# Patient Record
Sex: FEMALE | Race: OTHER | NOT HISPANIC OR LATINO
[De-identification: names, ages, dates, MRNs, and addresses within clinical notes are randomized per-mention and may not be internally consistent; named-entity substitution may affect disease eponyms.]

---

## 2019-08-21 PROBLEM — Z00.00 ENCOUNTER FOR PREVENTIVE HEALTH EXAMINATION: Status: ACTIVE | Noted: 2019-08-21

## 2019-08-28 ENCOUNTER — ASOB RESULT (OUTPATIENT)
Age: 29
End: 2019-08-28

## 2019-08-28 ENCOUNTER — APPOINTMENT (OUTPATIENT)
Dept: ANTEPARTUM | Facility: CLINIC | Age: 29
End: 2019-08-28
Payer: MEDICAID

## 2019-08-28 PROCEDURE — 99201 OFFICE OUTPATIENT NEW 10 MINUTES: CPT | Mod: 25,TH

## 2019-12-26 ENCOUNTER — EMERGENCY (EMERGENCY)
Facility: HOSPITAL | Age: 29
LOS: 1 days | Discharge: HOME | End: 2019-12-26
Admitting: EMERGENCY MEDICINE
Payer: COMMERCIAL

## 2019-12-26 VITALS
HEART RATE: 90 BPM | TEMPERATURE: 98 F | OXYGEN SATURATION: 100 % | DIASTOLIC BLOOD PRESSURE: 87 MMHG | SYSTOLIC BLOOD PRESSURE: 131 MMHG | RESPIRATION RATE: 18 BRPM

## 2019-12-26 VITALS
OXYGEN SATURATION: 99 % | RESPIRATION RATE: 17 BRPM | DIASTOLIC BLOOD PRESSURE: 95 MMHG | SYSTOLIC BLOOD PRESSURE: 160 MMHG | TEMPERATURE: 100 F | HEART RATE: 81 BPM

## 2019-12-26 LAB
ALBUMIN SERPL ELPH-MCNC: 3.2 G/DL — LOW (ref 3.3–5)
ALP SERPL-CCNC: 303 U/L — HIGH (ref 40–120)
ALT FLD-CCNC: 20 U/L — SIGNIFICANT CHANGE UP (ref 4–33)
ANION GAP SERPL CALC-SCNC: 12 MMO/L — SIGNIFICANT CHANGE UP (ref 7–14)
AST SERPL-CCNC: 29 U/L — SIGNIFICANT CHANGE UP (ref 4–32)
BASOPHILS # BLD AUTO: 0.02 K/UL — SIGNIFICANT CHANGE UP (ref 0–0.2)
BASOPHILS NFR BLD AUTO: 0.2 % — SIGNIFICANT CHANGE UP (ref 0–2)
BILIRUB SERPL-MCNC: 0.7 MG/DL — SIGNIFICANT CHANGE UP (ref 0.2–1.2)
BUN SERPL-MCNC: 6 MG/DL — LOW (ref 7–23)
CALCIUM SERPL-MCNC: 9 MG/DL — SIGNIFICANT CHANGE UP (ref 8.4–10.5)
CHLORIDE SERPL-SCNC: 104 MMOL/L — SIGNIFICANT CHANGE UP (ref 98–107)
CO2 SERPL-SCNC: 20 MMOL/L — LOW (ref 22–31)
CREAT SERPL-MCNC: 0.59 MG/DL — SIGNIFICANT CHANGE UP (ref 0.5–1.3)
EOSINOPHIL # BLD AUTO: 0.07 K/UL — SIGNIFICANT CHANGE UP (ref 0–0.5)
EOSINOPHIL NFR BLD AUTO: 0.7 % — SIGNIFICANT CHANGE UP (ref 0–6)
GLUCOSE SERPL-MCNC: 80 MG/DL — SIGNIFICANT CHANGE UP (ref 70–99)
HCT VFR BLD CALC: 34.2 % — LOW (ref 34.5–45)
HGB BLD-MCNC: 10.4 G/DL — LOW (ref 11.5–15.5)
IMM GRANULOCYTES NFR BLD AUTO: 0.4 % — SIGNIFICANT CHANGE UP (ref 0–1.5)
LYMPHOCYTES # BLD AUTO: 1.67 K/UL — SIGNIFICANT CHANGE UP (ref 1–3.3)
LYMPHOCYTES # BLD AUTO: 17.1 % — SIGNIFICANT CHANGE UP (ref 13–44)
MCHC RBC-ENTMCNC: 22 PG — LOW (ref 27–34)
MCHC RBC-ENTMCNC: 30.4 % — LOW (ref 32–36)
MCV RBC AUTO: 72.5 FL — LOW (ref 80–100)
MONOCYTES # BLD AUTO: 0.41 K/UL — SIGNIFICANT CHANGE UP (ref 0–0.9)
MONOCYTES NFR BLD AUTO: 4.2 % — SIGNIFICANT CHANGE UP (ref 2–14)
NEUTROPHILS # BLD AUTO: 7.57 K/UL — HIGH (ref 1.8–7.4)
NEUTROPHILS NFR BLD AUTO: 77.4 % — HIGH (ref 43–77)
NRBC # FLD: 0 K/UL — SIGNIFICANT CHANGE UP (ref 0–0)
PLATELET # BLD AUTO: 345 K/UL — SIGNIFICANT CHANGE UP (ref 150–400)
PMV BLD: 9.4 FL — SIGNIFICANT CHANGE UP (ref 7–13)
POTASSIUM SERPL-MCNC: 3.7 MMOL/L — SIGNIFICANT CHANGE UP (ref 3.5–5.3)
POTASSIUM SERPL-SCNC: 3.7 MMOL/L — SIGNIFICANT CHANGE UP (ref 3.5–5.3)
PROT SERPL-MCNC: 7.1 G/DL — SIGNIFICANT CHANGE UP (ref 6–8.3)
RBC # BLD: 4.72 M/UL — SIGNIFICANT CHANGE UP (ref 3.8–5.2)
RBC # FLD: 14.5 % — SIGNIFICANT CHANGE UP (ref 10.3–14.5)
SODIUM SERPL-SCNC: 136 MMOL/L — SIGNIFICANT CHANGE UP (ref 135–145)
WBC # BLD: 9.78 K/UL — SIGNIFICANT CHANGE UP (ref 3.8–10.5)
WBC # FLD AUTO: 9.78 K/UL — SIGNIFICANT CHANGE UP (ref 3.8–10.5)

## 2019-12-26 PROCEDURE — 99283 EMERGENCY DEPT VISIT LOW MDM: CPT

## 2019-12-26 PROCEDURE — 71045 X-RAY EXAM CHEST 1 VIEW: CPT | Mod: 26

## 2019-12-26 NOTE — ED PROVIDER NOTE - OBJECTIVE STATEMENT
Pt is a 30 y/o F pregnant (due date 1/15/20) p/w itching x 2 months and cough x 1 week. Pt speaks [place]; pacific  ID#[id number] used to confirm pt's preference to have , Misael, translate for her.  She states she has had constant unchanged whole body itching to whole body for past 2 months.  She saw ob/gyn who told pt to see a dermatologist, with whom pt followed up 1 month ago and was started on a cream, with which pt has been compliant BID; not sure what cream she was started on.  She was told to see dermatologist again after delivery of baby.  She also notes she has developed a gradual onset sore throat and cough, producing scant white nonbloody sputum associated with bilateral inner ear pain.  Pt also notes intermittent mild frontal headache, nonradiating which occurs only with "coughing hard."  Pt also notes nasal congestion.  Pt states she was seen at urgent care for symptoms and was told she had a viral syndrome.  She also notes constipation described as passing hard stools; last BM yesterday; passing gas today.  Pt denies any fevers, chills, nausea, vomiting, chest pain, SOB, numbness, weakness, neck pain/stiffness, pelvic pain, vaginal bleeding, vaginal discharge, cloudy urine, hematuria, leakage of fluid from vagina, decreased fetal movements.  She shares bed with  who does not have any itching or symptoms.  No other family members with same symptoms.

## 2019-12-26 NOTE — ED PROVIDER NOTE - NONTENDER LOCATION
right costovertebral angle/periumbilical/left costovertebral angle/suprapubic/left upper quadrant/right upper quadrant/umbilical/left lower quadrant/right lower quadrant

## 2019-12-26 NOTE — ED PROVIDER NOTE - LEFT EAR
Report to OTIS Gambino   TM intact; no effusion; no canal erythema/discharge; no mastoid tenderness/redness/swelling

## 2019-12-26 NOTE — ED PROVIDER NOTE - NSFOLLOWUPINSTRUCTIONS_ED_ALL_ED_FT
Advance activity as tolerated.  Continue all previously prescribed medications as directed unless otherwise instructed.  Take Tylenol 650mg (Two 325 mg pills) every 4-6 hours as needed for pain or fevers.  Follow up with your primary care physician and dermatology (referral list provided) in 48-72 hours- bring copies of your results.  Return to the ER for worsening or persistent symptoms, including but not limited to worsening/persistent itching, ear pain, hearing loss, headache, neck pain/stiffness, fevers, chest pain, shortness of breath, and/or ANY NEW OR CONCERNING SYMPTOMS. If you have issues obtaining follow up, please call: [phone] to obtain a doctor or specialist who takes your insurance in your area.  You may call [phone] to make an appointment with the internal medicine clinic.

## 2019-12-26 NOTE — ED PROVIDER NOTE - PROGRESS NOTE DETAILS
JANES OVERTON:  Preliminary read shows normal clear lungs.  Labs shows elevated alk phos without any other hepatic lab findings.  Pt medically stable for discharge.  Pt agrees to follow up with OB/GYN and dermatology.  Strict return precautions given.

## 2019-12-26 NOTE — ED PROVIDER NOTE - CLINICAL SUMMARY MEDICAL DECISION MAKING FREE TEXT BOX
Pt is a 30 y/o F pregnant (due date 1/15/20) p/w itching x 2 months and cough x 1 week. -- likely viral syndrome, r/o pneumonia, r/o eval for possible hepatic pathology; not clinically concerning for scabies; not clinically concerning for meningitis -- labs, cxr, outpatient derm follow up

## 2019-12-26 NOTE — ED PROVIDER NOTE - PHYSICAL EXAMINATION
+scabbed scattered excoriations to bilateral anterior shins and forearms    no nuchal rigidity    excoriations spares palms and soles  no intertriginous excoriation  (Chaperone:  JANES Toscano) +scabbed scattered excoriations to bilateral anterior shins and forearms    no nuchal rigidity    excoriations spares palms and soles  no intertriginous excoriation  (Chaperone:  JANES Toscano)    Fetal heart rate:  145 bpm

## 2019-12-26 NOTE — ED PROVIDER NOTE - PATIENT PORTAL LINK FT
You can access the FollowMyHealth Patient Portal offered by [facility] by registering at the following website: [email or web address]. By joining [employer]’s FollowMyHealth portal, you will also be able to view your health information using other applications (apps) compatible with our system.

## 2019-12-26 NOTE — ED ADULT TRIAGE NOTE - CHIEF COMPLAINT QUOTE
# Birgit #[id number]  "both of my ears are hurting, I have a cold and a rash and I'm pregnant" x 2 months "not getting better"  As per  "they gave her cream and it did not help"  Did not f/u with PMD.  ERIC 1/15/2019.  As per  used cream for 1 week w/o relief.  c/o "my whole body is hurting, my ears, my neck and my body is very itchy"  Pt appears to have dry patches to lower extremities.

## 2019-12-26 NOTE — ED PROVIDER NOTE - THROAT FINDINGS
no tonsillar swelling/NO VESICLES/ULCERS/NO TONGUE ELEVATION/no vesicles/NO STRIDOR/THROAT RED/NO DROOLING/no exudate

## 2019-12-27 NOTE — ED ADULT NURSE NOTE - CHIEF COMPLAINT QUOTE
# Birgit #[id number]  "both of my ears are hurting, I have a cold and a rash and I'm pregnant" x 2 months "not getting better"  As per  "they gave her cream and it did not help"  Did not f/u with PMD.  ERIC 1/15/2019.  As per  used cream for 1 week w/o relief.  c/o "my whole body is hurting, my ears, my neck and my body is very itchy"  Pt appears to have dry patches to lower extremities.

## 2020-01-06 ENCOUNTER — INPATIENT (INPATIENT)
Facility: HOSPITAL | Age: 30
LOS: 2 days | Discharge: HOME | End: 2020-01-09
Attending: OBSTETRICS & GYNECOLOGY | Admitting: OBSTETRICS & GYNECOLOGY

## 2020-01-06 ENCOUNTER — OUTPATIENT (OUTPATIENT)
Dept: INPATIENT UNIT | Facility: HOSPITAL | Age: 30
LOS: 1 days | End: 2020-01-06
Payer: MEDICAID

## 2020-01-06 VITALS
RESPIRATION RATE: 16 BRPM | DIASTOLIC BLOOD PRESSURE: 72 MMHG | SYSTOLIC BLOOD PRESSURE: 122 MMHG | TEMPERATURE: 99 F | HEART RATE: 93 BPM

## 2020-01-06 DIAGNOSIS — Z3A.00 WEEKS OF GESTATION OF PREGNANCY NOT SPECIFIED: ICD-10-CM

## 2020-01-06 DIAGNOSIS — O26.899 OTHER SPECIFIED PREGNANCY RELATED CONDITIONS, UNSPECIFIED TRIMESTER: ICD-10-CM

## 2020-01-07 LAB
ALBUMIN SERPL ELPH-MCNC: 3.1 G/DL — LOW (ref 3.3–5)
ALP SERPL-CCNC: 286 U/L — HIGH (ref 40–120)
ALT FLD-CCNC: 33 U/L — SIGNIFICANT CHANGE UP (ref 4–33)
ANION GAP SERPL CALC-SCNC: 12 MMO/L — SIGNIFICANT CHANGE UP (ref 7–14)
APPEARANCE UR: SIGNIFICANT CHANGE UP
APTT BLD: 26.7 SEC — LOW (ref 27.5–36.3)
AST SERPL-CCNC: 30 U/L — SIGNIFICANT CHANGE UP (ref 4–32)
BACTERIA # UR AUTO: SIGNIFICANT CHANGE UP
BASOPHILS # BLD AUTO: 0.02 K/UL — SIGNIFICANT CHANGE UP (ref 0–0.2)
BASOPHILS NFR BLD AUTO: 0.2 % — SIGNIFICANT CHANGE UP (ref 0–2)
BILIRUB SERPL-MCNC: 0.6 MG/DL — SIGNIFICANT CHANGE UP (ref 0.2–1.2)
BILIRUB UR-MCNC: NEGATIVE — SIGNIFICANT CHANGE UP
BLD GP AB SCN SERPL QL: NEGATIVE — SIGNIFICANT CHANGE UP
BLOOD UR QL VISUAL: NEGATIVE — SIGNIFICANT CHANGE UP
BUN SERPL-MCNC: 5 MG/DL — LOW (ref 7–23)
CALCIUM SERPL-MCNC: 8.8 MG/DL — SIGNIFICANT CHANGE UP (ref 8.4–10.5)
CHLORIDE SERPL-SCNC: 106 MMOL/L — SIGNIFICANT CHANGE UP (ref 98–107)
CO2 SERPL-SCNC: 21 MMOL/L — LOW (ref 22–31)
COLOR SPEC: YELLOW — SIGNIFICANT CHANGE UP
CREAT ?TM UR-MCNC: 96.2 MG/DL — SIGNIFICANT CHANGE UP
CREAT SERPL-MCNC: 0.57 MG/DL — SIGNIFICANT CHANGE UP (ref 0.5–1.3)
EOSINOPHIL # BLD AUTO: 0.07 K/UL — SIGNIFICANT CHANGE UP (ref 0–0.5)
EOSINOPHIL NFR BLD AUTO: 0.8 % — SIGNIFICANT CHANGE UP (ref 0–6)
FIBRINOGEN PPP-MCNC: 687 MG/DL — HIGH (ref 350–510)
GLUCOSE SERPL-MCNC: 79 MG/DL — SIGNIFICANT CHANGE UP (ref 70–99)
GLUCOSE UR-MCNC: NEGATIVE — SIGNIFICANT CHANGE UP
HCT VFR BLD CALC: 32.5 % — LOW (ref 34.5–45)
HCT VFR BLD CALC: 35.8 % — SIGNIFICANT CHANGE UP (ref 34.5–45)
HGB BLD-MCNC: 10 G/DL — LOW (ref 11.5–15.5)
HGB BLD-MCNC: 10.7 G/DL — LOW (ref 11.5–15.5)
HYALINE CASTS # UR AUTO: NEGATIVE — SIGNIFICANT CHANGE UP
IMM GRANULOCYTES NFR BLD AUTO: 0.4 % — SIGNIFICANT CHANGE UP (ref 0–1.5)
INR BLD: 1.01 — SIGNIFICANT CHANGE UP (ref 0.88–1.17)
KETONES UR-MCNC: SIGNIFICANT CHANGE UP
LDH SERPL L TO P-CCNC: 208 U/L — SIGNIFICANT CHANGE UP (ref 135–225)
LEUKOCYTE ESTERASE UR-ACNC: SIGNIFICANT CHANGE UP
LYMPHOCYTES # BLD AUTO: 1.98 K/UL — SIGNIFICANT CHANGE UP (ref 1–3.3)
LYMPHOCYTES # BLD AUTO: 21.5 % — SIGNIFICANT CHANGE UP (ref 13–44)
MCHC RBC-ENTMCNC: 21.7 PG — LOW (ref 27–34)
MCHC RBC-ENTMCNC: 21.9 PG — LOW (ref 27–34)
MCHC RBC-ENTMCNC: 29.9 % — LOW (ref 32–36)
MCHC RBC-ENTMCNC: 30.8 % — LOW (ref 32–36)
MCV RBC AUTO: 71.1 FL — LOW (ref 80–100)
MCV RBC AUTO: 72.5 FL — LOW (ref 80–100)
MONOCYTES # BLD AUTO: 0.46 K/UL — SIGNIFICANT CHANGE UP (ref 0–0.9)
MONOCYTES NFR BLD AUTO: 5 % — SIGNIFICANT CHANGE UP (ref 2–14)
NEUTROPHILS # BLD AUTO: 6.63 K/UL — SIGNIFICANT CHANGE UP (ref 1.8–7.4)
NEUTROPHILS NFR BLD AUTO: 72.1 % — SIGNIFICANT CHANGE UP (ref 43–77)
NITRITE UR-MCNC: NEGATIVE — SIGNIFICANT CHANGE UP
NRBC # FLD: 0 K/UL — SIGNIFICANT CHANGE UP (ref 0–0)
NRBC # FLD: 0 K/UL — SIGNIFICANT CHANGE UP (ref 0–0)
PH UR: 6.5 — SIGNIFICANT CHANGE UP (ref 5–8)
PLATELET # BLD AUTO: 348 K/UL — SIGNIFICANT CHANGE UP (ref 150–400)
PLATELET # BLD AUTO: 412 K/UL — HIGH (ref 150–400)
PMV BLD: 10 FL — SIGNIFICANT CHANGE UP (ref 7–13)
PMV BLD: 10.3 FL — SIGNIFICANT CHANGE UP (ref 7–13)
POTASSIUM SERPL-MCNC: 3.6 MMOL/L — SIGNIFICANT CHANGE UP (ref 3.5–5.3)
POTASSIUM SERPL-SCNC: 3.6 MMOL/L — SIGNIFICANT CHANGE UP (ref 3.5–5.3)
PROT SERPL-MCNC: 6.4 G/DL — SIGNIFICANT CHANGE UP (ref 6–8.3)
PROT UR-MCNC: 30 — SIGNIFICANT CHANGE UP
PROT UR-MCNC: 41.6 MG/DL — SIGNIFICANT CHANGE UP
PROTHROM AB SERPL-ACNC: 11.2 SEC — SIGNIFICANT CHANGE UP (ref 9.8–13.1)
RBC # BLD: 4.57 M/UL — SIGNIFICANT CHANGE UP (ref 3.8–5.2)
RBC # BLD: 4.94 M/UL — SIGNIFICANT CHANGE UP (ref 3.8–5.2)
RBC # FLD: 15.4 % — HIGH (ref 10.3–14.5)
RBC # FLD: 15.4 % — HIGH (ref 10.3–14.5)
RBC CASTS # UR COMP ASSIST: SIGNIFICANT CHANGE UP (ref 0–?)
RH IG SCN BLD-IMP: POSITIVE — SIGNIFICANT CHANGE UP
RH IG SCN BLD-IMP: POSITIVE — SIGNIFICANT CHANGE UP
SODIUM SERPL-SCNC: 139 MMOL/L — SIGNIFICANT CHANGE UP (ref 135–145)
SP GR SPEC: 1.02 — SIGNIFICANT CHANGE UP (ref 1–1.04)
SQUAMOUS # UR AUTO: SIGNIFICANT CHANGE UP
T PALLIDUM AB TITR SER: NEGATIVE — SIGNIFICANT CHANGE UP
URATE SERPL-MCNC: 4.6 MG/DL — SIGNIFICANT CHANGE UP (ref 2.5–7)
UROBILINOGEN FLD QL: NORMAL — SIGNIFICANT CHANGE UP
WBC # BLD: 9.2 K/UL — SIGNIFICANT CHANGE UP (ref 3.8–10.5)
WBC # BLD: 9.47 K/UL — SIGNIFICANT CHANGE UP (ref 3.8–10.5)
WBC # FLD AUTO: 9.2 K/UL — SIGNIFICANT CHANGE UP (ref 3.8–10.5)
WBC # FLD AUTO: 9.47 K/UL — SIGNIFICANT CHANGE UP (ref 3.8–10.5)
WBC UR QL: HIGH (ref 0–?)

## 2020-01-07 RX ORDER — INFLUENZA VIRUS VACCINE 15; 15; 15; 15 UG/.5ML; UG/.5ML; UG/.5ML; UG/.5ML
0.5 SUSPENSION INTRAMUSCULAR ONCE
Refills: 0 | Status: DISCONTINUED | OUTPATIENT
Start: 2020-01-07 | End: 2020-01-09

## 2020-01-07 RX ORDER — SODIUM CHLORIDE 9 MG/ML
1000 INJECTION, SOLUTION INTRAVENOUS
Refills: 0 | Status: DISCONTINUED | OUTPATIENT
Start: 2020-01-07 | End: 2020-01-07

## 2020-01-07 RX ORDER — OXYTOCIN 10 UNIT/ML
333.33 VIAL (ML) INJECTION
Qty: 20 | Refills: 0 | Status: DISCONTINUED | OUTPATIENT
Start: 2020-01-07 | End: 2020-01-07

## 2020-01-07 RX ORDER — GLYCERIN ADULT
1 SUPPOSITORY, RECTAL RECTAL AT BEDTIME
Refills: 0 | Status: DISCONTINUED | OUTPATIENT
Start: 2020-01-07 | End: 2020-01-09

## 2020-01-07 RX ORDER — AER TRAVELER 0.5 G/1
1 SOLUTION RECTAL; TOPICAL EVERY 4 HOURS
Refills: 0 | Status: DISCONTINUED | OUTPATIENT
Start: 2020-01-07 | End: 2020-01-09

## 2020-01-07 RX ORDER — OXYCODONE HYDROCHLORIDE 5 MG/1
5 TABLET ORAL ONCE
Refills: 0 | Status: DISCONTINUED | OUTPATIENT
Start: 2020-01-07 | End: 2020-01-09

## 2020-01-07 RX ORDER — HYDROCORTISONE 1 %
1 OINTMENT (GRAM) TOPICAL EVERY 6 HOURS
Refills: 0 | Status: DISCONTINUED | OUTPATIENT
Start: 2020-01-07 | End: 2020-01-09

## 2020-01-07 RX ORDER — IBUPROFEN 200 MG
600 TABLET ORAL EVERY 6 HOURS
Refills: 0 | Status: COMPLETED | OUTPATIENT
Start: 2020-01-07 | End: 2020-12-05

## 2020-01-07 RX ORDER — CITRIC ACID/SODIUM CITRATE 300-500 MG
15 SOLUTION, ORAL ORAL EVERY 6 HOURS
Refills: 0 | Status: DISCONTINUED | OUTPATIENT
Start: 2020-01-07 | End: 2020-01-07

## 2020-01-07 RX ORDER — DIPHENHYDRAMINE HCL 50 MG
25 CAPSULE ORAL EVERY 6 HOURS
Refills: 0 | Status: DISCONTINUED | OUTPATIENT
Start: 2020-01-07 | End: 2020-01-09

## 2020-01-07 RX ORDER — MAGNESIUM HYDROXIDE 400 MG/1
30 TABLET, CHEWABLE ORAL
Refills: 0 | Status: DISCONTINUED | OUTPATIENT
Start: 2020-01-07 | End: 2020-01-09

## 2020-01-07 RX ORDER — TETANUS TOXOID, REDUCED DIPHTHERIA TOXOID AND ACELLULAR PERTUSSIS VACCINE, ADSORBED 5; 2.5; 8; 8; 2.5 [IU]/.5ML; [IU]/.5ML; UG/.5ML; UG/.5ML; UG/.5ML
0.5 SUSPENSION INTRAMUSCULAR ONCE
Refills: 0 | Status: DISCONTINUED | OUTPATIENT
Start: 2020-01-07 | End: 2020-01-09

## 2020-01-07 RX ORDER — DIPHENHYDRAMINE HCL 50 MG
25 CAPSULE ORAL EVERY 4 HOURS
Refills: 0 | Status: DISCONTINUED | OUTPATIENT
Start: 2020-01-07 | End: 2020-01-09

## 2020-01-07 RX ORDER — IBUPROFEN 200 MG
600 TABLET ORAL EVERY 6 HOURS
Refills: 0 | Status: DISCONTINUED | OUTPATIENT
Start: 2020-01-07 | End: 2020-01-09

## 2020-01-07 RX ORDER — LANOLIN
1 OINTMENT (GRAM) TOPICAL EVERY 6 HOURS
Refills: 0 | Status: DISCONTINUED | OUTPATIENT
Start: 2020-01-07 | End: 2020-01-09

## 2020-01-07 RX ORDER — SIMETHICONE 80 MG/1
80 TABLET, CHEWABLE ORAL EVERY 4 HOURS
Refills: 0 | Status: DISCONTINUED | OUTPATIENT
Start: 2020-01-07 | End: 2020-01-09

## 2020-01-07 RX ORDER — PRAMOXINE HYDROCHLORIDE 150 MG/15G
1 AEROSOL, FOAM RECTAL EVERY 4 HOURS
Refills: 0 | Status: DISCONTINUED | OUTPATIENT
Start: 2020-01-07 | End: 2020-01-09

## 2020-01-07 RX ORDER — BENZOCAINE 10 %
1 GEL (GRAM) MUCOUS MEMBRANE EVERY 6 HOURS
Refills: 0 | Status: DISCONTINUED | OUTPATIENT
Start: 2020-01-07 | End: 2020-01-09

## 2020-01-07 RX ORDER — KETOROLAC TROMETHAMINE 30 MG/ML
30 SYRINGE (ML) INJECTION ONCE
Refills: 0 | Status: DISCONTINUED | OUTPATIENT
Start: 2020-01-07 | End: 2020-01-07

## 2020-01-07 RX ORDER — SODIUM CHLORIDE 9 MG/ML
3 INJECTION INTRAMUSCULAR; INTRAVENOUS; SUBCUTANEOUS EVERY 8 HOURS
Refills: 0 | Status: DISCONTINUED | OUTPATIENT
Start: 2020-01-07 | End: 2020-01-09

## 2020-01-07 RX ORDER — OXYCODONE HYDROCHLORIDE 5 MG/1
5 TABLET ORAL
Refills: 0 | Status: DISCONTINUED | OUTPATIENT
Start: 2020-01-07 | End: 2020-01-09

## 2020-01-07 RX ORDER — ACETAMINOPHEN 500 MG
975 TABLET ORAL
Refills: 0 | Status: DISCONTINUED | OUTPATIENT
Start: 2020-01-07 | End: 2020-01-09

## 2020-01-07 RX ORDER — DIBUCAINE 1 %
1 OINTMENT (GRAM) RECTAL EVERY 6 HOURS
Refills: 0 | Status: DISCONTINUED | OUTPATIENT
Start: 2020-01-07 | End: 2020-01-09

## 2020-01-07 RX ADMIN — Medication 25 MILLIGRAM(S): at 03:25

## 2020-01-07 RX ADMIN — Medication 30 MILLIGRAM(S): at 17:13

## 2020-01-07 RX ADMIN — Medication 600 MILLIGRAM(S): at 22:59

## 2020-01-07 RX ADMIN — Medication 30 MILLIGRAM(S): at 18:00

## 2020-01-07 RX ADMIN — Medication 1000 MILLIUNIT(S)/MIN: at 15:25

## 2020-01-07 RX ADMIN — SODIUM CHLORIDE 125 MILLILITER(S): 9 INJECTION, SOLUTION INTRAVENOUS at 01:00

## 2020-01-07 NOTE — OB PROVIDER H&P - ASSESSMENT
28yo  @38,4 p/f IOL 2/2 ICP  - Admit to L&D  - HELLP Labs  - FHT/TOCO  - IOL with  - Anticipate     Umang Wade PGY1   dw.

## 2020-01-07 NOTE — OB PROVIDER DELIVERY SUMMARY - NSPROVIDERDELIVERYNOTE_OBGYN_ALL_OB_FT
of a live baby girl.  Difficulty delivering shoulder due to lack of maternal effort.  Baby delivered with help of suprapubic & [place].  Baby handed to nurse.  Placenta & membranes delivered spontaneously & intact.  Cervix & vagina inspected.  No laceration noted.  Hemostasis assured.  Mother & baby in stable condition.

## 2020-01-07 NOTE — CHART NOTE - NSCHARTNOTEFT_GEN_A_CORE
R4 Note    Pt examined for cervical change, c/o increase in pain.    Vital Signs Last 24 Hrs  T(C): 36.7 (07 Jan 2020 04:26), Max: 37.3 (06 Jan 2020 20:51)  T(F): 98.06 (07 Jan 2020 04:26), Max: 99.1 (06 Jan 2020 20:51)  HR: 81 (07 Jan 2020 04:26) (81 - 93)  BP: 131/83 (07 Jan 2020 04:26) (109/68 - 131/83)  BP(mean): --  RR: 18 (07 Jan 2020 00:22) (16 - 18)  SpO2: --    VE: 3/60/-3  EFM: 140bl/mod/+accels/-decels  [place]: q2min    IOL for ICP  -epi for pain control  -c/w PO prn for IOL    d/w Dr. Francesco Stockton, pgy4

## 2020-01-07 NOTE — OB PROVIDER H&P - NSHPREVIEWOFSYSTEMS_GEN_ALL_CORE
GENERAL: no fever, chills  HEENT: no throat pain, cough, congestion, dysphagia  CARDIAC: no chest pain, palpitations  PULM: no shortness of breath, cough, wheezing   GI: no abdominal pain, nausea, vomiting, diarrhea, constipation  : no dysuria, frequency  NEURO: no headache, lightheadedness  MSK: no joint or muscle pain  SKIN: no rashes  HEME: no active bleeding

## 2020-01-07 NOTE — CHART NOTE - NSCHARTNOTEFT_GEN_A_CORE
R1 Note 01-07-20 @ 08:04    Pt examined for progression    VITALS:  T(C): 36.7 (01-07-20 @ 04:26), Max: 37.3 (01-06-20 @ 20:51)  HR: 84 (01-07-20 @ 07:57) (74 - 107)  BP: 117/59 (01-07-20 @ 07:57) (105/55 - 131/83)  RR: 18 (01-07-20 @ 00:22) (16 - 18)  SpO2: 97% (01-07-20 @ 07:48) (89% - 98%)    EFM:  mod joy +Accels -decels  [place]: Ctx Q4-5min  VE: 4/60/-3    IMPRESSION:   FHR Category: 1  Additional:    PLAN:  Cont PO cyto      d/w Dr. Roney Vargas PGY1

## 2020-01-07 NOTE — OB PROVIDER H&P - HISTORY OF PRESENT ILLNESS
30yo  @38w5d  p/f IOL 2/ ICP. –VB, -LOF, -Ctx, +FM. denies fever, chills, nausea, vomiting, diarrhea, headache, constipation, dizziness, syncope, chest pain, palpitations, shortness of breath, dysuria, urgency, frequency.  PNC: ICP diagnoses today. Bile acid levels unknown. Pt does have itching but not on any medication  GBS: unk  EFW: 3200  ObHx: , , 2015, 2018. Weight of children unknown  GynHx: denies  MedHx: denies  SrgHx: denies  PsychHx: denies  SocialHx: denies  AllergyHx: denies  RxHx: denies

## 2020-01-07 NOTE — CHART NOTE - NSCHARTNOTEFT_GEN_A_CORE
Back note 2/2 clinical duties   Back note 30 min     Pt examined for progression   Feeling rectal pressure    /-2  Cat 1 Tracing     - Cont PO  - Call for top off   - Anticipate     dw Dr. Roney Schmid PGY1

## 2020-01-07 NOTE — CHART NOTE - NSCHARTNOTEFT_GEN_A_CORE
R1 Chart Note    Patient was unable to be consented using the  services due to no interpretor for "[place]" being available at this time. I spoke with Shivam #[id number]. Pts  was bedside and spoke English therefore consents were obtained through him    Umang Wade PGY1

## 2020-01-07 NOTE — OB RN DELIVERY SUMMARY - NS_SEPSISRSKCALC_OBGYN_ALL_OB_FT
EOS calculated successfully. EOS Risk Factor: 0.5/1000 live births ([facility] national incidence); GA=38w6d; Temp=99.1; ROM=2.483; GBS='Negative'; Antibiotics='No antibiotics or any antibiotics < 2 hrs prior to birth'

## 2020-01-08 ENCOUNTER — TRANSCRIPTION ENCOUNTER (OUTPATIENT)
Age: 30
End: 2020-01-08

## 2020-01-08 RX ADMIN — Medication 600 MILLIGRAM(S): at 09:45

## 2020-01-08 RX ADMIN — Medication 600 MILLIGRAM(S): at 15:31

## 2020-01-08 RX ADMIN — Medication 600 MILLIGRAM(S): at 16:30

## 2020-01-08 RX ADMIN — Medication 600 MILLIGRAM(S): at 08:51

## 2020-01-08 NOTE — DISCHARGE NOTE OB - CARE PROVIDER_API CALL
Stacey Fleming (DO)  Obstetrics and Gynecology  [address] [place] [address]  Phone: [phone]  Fax: [phone]  Follow Up Time:

## 2020-01-08 NOTE — DISCHARGE NOTE OB - PATIENT PORTAL LINK FT
You can access the FollowMyHealth Patient Portal offered by [facility] by registering at the following website: [email or web address]. By joining [employer]’s FollowMyHealth portal, you will also be able to view your health information using other applications (apps) compatible with our system.

## 2020-01-08 NOTE — PROGRESS NOTE ADULT - SUBJECTIVE AND OBJECTIVE BOX
patient seen and examined at bedside.  patient is comfortable - ambulating and voiding.  pt is breast and bottle feeding.  pt denies breast engorgement.        GEN: NAD  ABD: soft, nondistended, nontender  UTERUS: firm below umbilicus  VAG: no blood noted  LE: no calf tenderness or edema noted bilaterally

## 2020-01-08 NOTE — DISCHARGE NOTE OB - CARE PLAN
Principal Discharge DX:	 (normal spontaneous vaginal delivery)  Goal:	pelvic rest x6 weeks  Assessment and plan of treatment:	pelvic rest x 6 weeks

## 2020-01-08 NOTE — DISCHARGE NOTE OB - CHANGE SANITARY PADS FREQUENTLY.  WASHING AND WIPING SHOULD OCCUR FROM FRONT TO BACK
Rebeca is asking for a call as she is having trouble with getting the Eliquis approved. Please call   Statement Selected

## 2020-01-09 VITALS
RESPIRATION RATE: 18 BRPM | TEMPERATURE: 98 F | DIASTOLIC BLOOD PRESSURE: 68 MMHG | HEART RATE: 64 BPM | OXYGEN SATURATION: 99 % | SYSTOLIC BLOOD PRESSURE: 126 MMHG

## 2020-01-09 RX ADMIN — Medication 600 MILLIGRAM(S): at 06:23

## 2020-01-09 NOTE — CHART NOTE - NSCHARTNOTEFT_GEN_A_CORE
Patient seen at 1158 due to reports of pruritis with rash that started in pregnancy about 2 months ago. Patient preferred language is [place], refused phone  and wants sister in law to translate. Patient has healed skin rash noted to abdomen, arms bilaterally and legs bilaterally. Spoke to Dr. Fleming about patient's status, history and assessment. As per Dr. Fleming patient  had cholestasis of pregnancy and had reports pruritis with rash. Patient was referred to dermatologist and was ordered calamine lotion. Patient reports using a "wash" during pregnancy. Patient instructed to review medication at home and is allowed to continue calamine lotion as prescribed. Patient instructed to notified Ellen Fleming with any concerns, questions and/or worsening of condition.

## 2020-02-27 DIAGNOSIS — O14.90 UNSPECIFIED PRE-ECLAMPSIA, UNSPECIFIED TRIMESTER: ICD-10-CM

## 2020-02-27 LAB
ALBUMIN SERPL ELPH-MCNC: 4.1 G/DL — SIGNIFICANT CHANGE UP (ref 3.3–5)
ALP SERPL-CCNC: 103 U/L — SIGNIFICANT CHANGE UP (ref 40–120)
ALT FLD-CCNC: 30 U/L — SIGNIFICANT CHANGE UP (ref 4–33)
ANION GAP SERPL CALC-SCNC: 10 MMO/L — SIGNIFICANT CHANGE UP (ref 7–14)
APTT BLD: 33.1 SEC — SIGNIFICANT CHANGE UP (ref 27.5–36.3)
AST SERPL-CCNC: 21 U/L — SIGNIFICANT CHANGE UP (ref 4–32)
BASOPHILS # BLD AUTO: 0.03 K/UL — SIGNIFICANT CHANGE UP (ref 0–0.2)
BASOPHILS NFR BLD AUTO: 0.4 % — SIGNIFICANT CHANGE UP (ref 0–2)
BILIRUB SERPL-MCNC: 0.2 MG/DL — SIGNIFICANT CHANGE UP (ref 0.2–1.2)
BUN SERPL-MCNC: 11 MG/DL — SIGNIFICANT CHANGE UP (ref 7–23)
CALCIUM SERPL-MCNC: 9.4 MG/DL — SIGNIFICANT CHANGE UP (ref 8.4–10.5)
CHLORIDE SERPL-SCNC: 105 MMOL/L — SIGNIFICANT CHANGE UP (ref 98–107)
CO2 SERPL-SCNC: 24 MMOL/L — SIGNIFICANT CHANGE UP (ref 22–31)
CREAT SERPL-MCNC: 0.63 MG/DL — SIGNIFICANT CHANGE UP (ref 0.5–1.3)
EOSINOPHIL # BLD AUTO: 0.17 K/UL — SIGNIFICANT CHANGE UP (ref 0–0.5)
EOSINOPHIL NFR BLD AUTO: 2.5 % — SIGNIFICANT CHANGE UP (ref 0–6)
FIBRINOGEN PPP-MCNC: 396 MG/DL — SIGNIFICANT CHANGE UP (ref 350–510)
GLUCOSE SERPL-MCNC: 83 MG/DL — SIGNIFICANT CHANGE UP (ref 70–99)
HCT VFR BLD CALC: 38 % — SIGNIFICANT CHANGE UP (ref 34.5–45)
HGB BLD-MCNC: 11.4 G/DL — LOW (ref 11.5–15.5)
IMM GRANULOCYTES NFR BLD AUTO: 0.4 % — SIGNIFICANT CHANGE UP (ref 0–1.5)
INR BLD: 1.02 — SIGNIFICANT CHANGE UP (ref 0.88–1.17)
LDH SERPL L TO P-CCNC: 166 U/L — SIGNIFICANT CHANGE UP (ref 135–225)
LYMPHOCYTES # BLD AUTO: 2.34 K/UL — SIGNIFICANT CHANGE UP (ref 1–3.3)
LYMPHOCYTES # BLD AUTO: 34 % — SIGNIFICANT CHANGE UP (ref 13–44)
MCHC RBC-ENTMCNC: 22 PG — LOW (ref 27–34)
MCHC RBC-ENTMCNC: 30 % — LOW (ref 32–36)
MCV RBC AUTO: 73.2 FL — LOW (ref 80–100)
MONOCYTES # BLD AUTO: 0.48 K/UL — SIGNIFICANT CHANGE UP (ref 0–0.9)
MONOCYTES NFR BLD AUTO: 7 % — SIGNIFICANT CHANGE UP (ref 2–14)
NEUTROPHILS # BLD AUTO: 3.83 K/UL — SIGNIFICANT CHANGE UP (ref 1.8–7.4)
NEUTROPHILS NFR BLD AUTO: 55.7 % — SIGNIFICANT CHANGE UP (ref 43–77)
NRBC # FLD: 0 K/UL — SIGNIFICANT CHANGE UP (ref 0–0)
PLATELET # BLD AUTO: 282 K/UL — SIGNIFICANT CHANGE UP (ref 150–400)
PMV BLD: 9.4 FL — SIGNIFICANT CHANGE UP (ref 7–13)
POTASSIUM SERPL-MCNC: 4 MMOL/L — SIGNIFICANT CHANGE UP (ref 3.5–5.3)
POTASSIUM SERPL-SCNC: 4 MMOL/L — SIGNIFICANT CHANGE UP (ref 3.5–5.3)
PROT SERPL-MCNC: 7.1 G/DL — SIGNIFICANT CHANGE UP (ref 6–8.3)
PROTHROM AB SERPL-ACNC: 11.7 SEC — SIGNIFICANT CHANGE UP (ref 9.8–13.1)
RBC # BLD: 5.19 M/UL — SIGNIFICANT CHANGE UP (ref 3.8–5.2)
RBC # FLD: 21.6 % — HIGH (ref 10.3–14.5)
SODIUM SERPL-SCNC: 139 MMOL/L — SIGNIFICANT CHANGE UP (ref 135–145)
TROPONIN T, HIGH SENSITIVITY: < 6 NG/L — SIGNIFICANT CHANGE UP (ref ?–14)
URATE SERPL-MCNC: 4.6 MG/DL — SIGNIFICANT CHANGE UP (ref 2.5–7)
WBC # BLD: 6.88 K/UL — SIGNIFICANT CHANGE UP (ref 3.8–10.5)
WBC # FLD AUTO: 6.88 K/UL — SIGNIFICANT CHANGE UP (ref 3.8–10.5)

## 2020-02-27 PROCEDURE — 93010 ELECTROCARDIOGRAM REPORT: CPT

## 2020-02-27 RX ORDER — SODIUM CHLORIDE 9 MG/ML
3 INJECTION INTRAMUSCULAR; INTRAVENOUS; SUBCUTANEOUS EVERY 8 HOURS
Refills: 0 | Status: DISCONTINUED | OUTPATIENT
Start: 2020-02-27 | End: 2020-02-27

## 2020-02-27 RX ORDER — ACETAMINOPHEN 500 MG
975 TABLET ORAL ONCE
Refills: 0 | Status: COMPLETED | OUTPATIENT
Start: 2020-02-27 | End: 2020-02-27

## 2020-02-27 RX ADMIN — Medication 975 MILLIGRAM(S): at 21:44

## 2020-02-27 NOTE — DISCHARGE NOTE OB - MEDICATION SUMMARY - MEDICATIONS TO TAKE
I will START or STAY ON the medications listed below when I get home from the hospital:    Tylenol 325 mg oral tablet  -- 3 tab(s) by mouth every 8 hours  -- Indication: For headache

## 2020-02-27 NOTE — OB PROVIDER TRIAGE NOTE - HISTORY OF PRESENT ILLNESS
LEATHA SPEAKING PT; FAMILY MEMBER AT BEDSIDE TRANSLATING    31yo  s/p  2020 after IOL for cholestasis of pregnancy. Pt presenting from Dr. Fleming's office for elevated BPs (does not know how elevated). Pt reports sx of parietal and occipital HA x1 week 5-6/10 pain. HA is associated with blurry vision and flashing lights in vision. Pt currently has HA and episode of flashing lights 10mins prior to evaluation. Denies RUQ or epigastric discomfort. +Acid reflex, constipation. Denies fever/chills. Denies vaginal bleeding. Menses has not yet resumed.      Separately, pt reports chest discomfort that radiates to her back and down her L arm. Denies pain in her L jaw. Pt reports having this pain during pregnancy as well, however, it has worsened recently. Denies SOB, nausea/vomiting, diaphoresis. LEATHA SPEAKING PT; FAMILY MEMBER AT BEDSIDE TRANSLATING    29yo  s/p  2020 after IOL for cholestasis of pregnancy. Pt presenting from Dr. Fleming's office for elevated BPs (does not know how elevated). Pt reports sx of parietal and occipital HA x1 week 5-6/10 pain. HA is associated with blurry vision and flashing lights in vision. Pt currently has HA and episode of flashing lights 10mins prior to evaluation. Patient has not used medications for her HA. Denies RUQ or epigastric discomfort. +Acid reflex, constipation. Denies fever/chills. Denies vaginal bleeding. Menses has not yet resumed.      Separately, pt reports chest discomfort that radiates to her back and down her L arm. Denies pain in her L jaw. Pt reports having this pain during pregnancy as well, however, it has worsened recently. Denies SOB, nausea/vomiting, diaphoresis.

## 2020-02-27 NOTE — OB PROVIDER TRIAGE NOTE - ADDITIONAL INSTRUCTIONS
- Continue oral hydration (8 glasses of water per day).  - Use Tylenol 975mg (2 extra strength or 3 regular tabs) for HA.   - Return to Dr. Fleming's office within 1 week for follow-up

## 2020-02-27 NOTE — DISCHARGE NOTE OB - CARE PROVIDER_API CALL
Stacey Fleming (DO)  Obstetrics and Gynecology  [address] [place] [address]  Phone: [phone]  Fax: [phone]  Follow Up Time:

## 2020-02-27 NOTE — DISCHARGE NOTE OB - HOSPITAL COURSE
31yo  s/p  2020 after IOL for cholestasis of pregnancy. Pt presented from Dr. Fleming's office for elevated BPs (does not know how elevated). Pt reported sx of parietal and occipital HA x1 week 5-6/10 pain, associated with blurry vision and flashing lights in vision. Patient had not used medications for her HA. Separately, pt reported chest discomfort that radiates to her back and down her L arm. Denied pain in her L jaw. Pt reports having this pain during pregnancy as well, however, it has worsened recently.     Evaluation revealed HELLP wnl, Trop neg, EKG NSR, CT Head negative for intracranial pathology. Pt reported improvement in HA after Tylenol.

## 2020-02-27 NOTE — OB PROVIDER TRIAGE NOTE - NSOBPROVIDERNOTE_OBGYN_ALL_OB_FT
31yo s/p  2020 presenting for elevated BPs in office associated with HA and vision changes. Pt additionally with complaints of chest discomfort radiating down L arm.   #r/o PP PEC  - BPs 110-120/60-70 in triage  - CT Head noncon for HA with vision changes  - HELLP labs   #Chest pain  - STAT EKG  - F/U Troponin 29yo s/p  2020 presenting for elevated BPs in office associated with HA and vision changes. Pt additionally with complaints of chest discomfort radiating down L arm.     Plan @   #r/o PP PEC  - BPs 110-120/60-70 in triage  - CT Head noncon for HA with vision changes  - HELLP labs   #Chest pain  - STAT EKG  - F/U Troponin    Plan @  - HELLP labs reviewed, wnl  - EKG NSR, Troponin <6  - CT Head pending 29yo s/p  2020 presenting for elevated BPs in office associated with HA and vision changes. Pt additionally with complaints of chest discomfort radiating down L arm.     Plan @   #r/o PP PEC  - BPs 110-120/60-70 in triage  - CT Head noncon for HA with vision changes  - HELLP labs   #Chest pain  - STAT EKG  - F/U Troponin    Plan @  - HELLP labs reviewed, wnl  - EKG NSR, Troponin <6  - CT Head pending    Pt re-evaluated @ after returning from CT. Continues to report HA, however, reports improvement in vision changes and CP.   - Tylenol 975mg  - F/U CT Head 31yo s/p  2020 presenting for elevated BPs in office associated with HA and vision changes. Pt additionally with complaints of chest discomfort radiating down L arm.     Plan @ 193  #r/o PP PEC  - BPs 110-120/60-70 in triage  - CT Head noncon for HA with vision changes  - HELLP labs   #Chest pain  - STAT EKG  - F/U Troponin    Plan @  - HELLP labs reviewed, wnl  - EKG NSR, Troponin <6  - CT Head pending    Pt re-evaluated @ after returning from CT. Continues to report HA, however, reports improvement in vision changes and CP.   - Tylenol 975mg  - F/U CT Head    Pt re-evaluated @ requesting to go home. Reports improvement  in HA after Tylenol. CT Head prelim results negative for intracranial pathology.  - Pt not meeting criteria for pp PEC by work-up or natural history of PEC (> 6wk complaints). BPs have remained 100-120s/60-70s.   - Encouraged pt to continue po hydration and use Tylenol 975mg PRN for HA  - Return to Dr. Fleming's office within 1 week for follow-up     D/w Dr. Arina Gloria PGY-1

## 2020-02-27 NOTE — OB PROVIDER TRIAGE NOTE - PLAN OF CARE
Recovery - Continue oral hydration (8 glasses of water per day).  - Use Tylenol 975mg (2 extra strength or 3 regular tabs) for HA.   - Return to Dr. Fleming's office within 1 week for follow-up

## 2020-02-27 NOTE — DISCHARGE NOTE OB - ADDITIONAL INSTRUCTIONS
- Continue oral hydration (8 glasses of water per day).  - Use Tylenol 975mg (2 extra strength or 3 regular tabs) for HA.   - Return to Dr. Fleming's office within 1 week for follow-up.

## 2020-02-27 NOTE — DISCHARGE NOTE OB - PLAN OF CARE
Recovery - Continue oral hydration (8 glasses of water per day).  - Use Tylenol 975mg (2 extra strength or 3 regular tabs) for HA.   - Return to Dr. Fleming's office within 1 week for follow-up.

## 2020-02-27 NOTE — DISCHARGE NOTE OB - PATIENT PORTAL LINK FT
You can access the FollowMyHealth Patient Portal offered by [facility] by registering at the following website: [email or web address]. By joining [employer]’s FollowMyHealth portal, you will also be able to view your health information using other applications (apps) compatible with our system.

## 2020-02-27 NOTE — OB PROVIDER TRIAGE NOTE - NSHPPHYSICALEXAM_GEN_ALL_CORE
Normal S1/S2, regular rate  CTAB  Soft, nontender, nondistended  Nontender extremities  Reflexes +2 b/l

## 2020-02-27 NOTE — OB PROVIDER TRIAGE NOTE - NSHPLABSRESULTS_GEN_ALL_CORE
11.4   6.88  )-----------( 282      ( 02-27 @ 19:20 )             38.0     02-27 @ 19:20    139  |  105  |  11  ----------------------------<  83  4.0   |  24  |  0.63    Ca    9.4      02-27 @ 19:20    TPro  7.1  /  Alb  4.1  /  TBili  0.2  /  DBili  x   /  AST  21  /  ALT  30  /  AlkPhos  103  02-27 @ 19:20    PT/INR - ( 02-27 @ 19:20 )   PT: 11.7 SEC;   INR: 1.02     PTT - ( 02-27 @ 19:20 )  PTT:33.1 SEC    Uric Acid: (02-27 @ 19:20)  4.6      Fibrinogen: (02-27 @ 19:20)  396.0    LDH: (02-27 @ 19:20)  166      Troponin-T: (02-27 @ 19:20):  <6    EKG: NSR

## 2020-02-27 NOTE — DISCHARGE NOTE OB - CARE PLAN
Principal Discharge DX:	Headache  Goal:	Recovery  Assessment and plan of treatment:	- Continue oral hydration (8 glasses of water per day).  - Use Tylenol 975mg (2 extra strength or 3 regular tabs) for HA.   - Return to Dr. Fleming's office within 1 week for follow-up.

## 2020-03-08 RX ORDER — ACETAMINOPHEN 500 MG
3 TABLET ORAL
Qty: 0 | Refills: 0 | DISCHARGE

## 2024-09-29 NOTE — OB RN PATIENT PROFILE - EXTENSIONS OF SELF_ADULT
Group Topic: BH Group OT    Date: 9/29/2024  Start Time: 0945  End Time: 1030  Facilitators: Yessi Pablo OT    Focus: OT Group - Id mood and daily goal. Discuss healthy coping strategies to improve mood and decrease stressors.   Number in attendance: 3    Pt was sitting/walking in the group room but was resistive to  participate. She said, \"I know nothing\". Pt appeared restless and anxious.      None